# Patient Record
Sex: MALE | Race: WHITE | Employment: OTHER | URBAN - METROPOLITAN AREA
[De-identification: names, ages, dates, MRNs, and addresses within clinical notes are randomized per-mention and may not be internally consistent; named-entity substitution may affect disease eponyms.]

---

## 2017-02-27 ENCOUNTER — APPOINTMENT (OUTPATIENT)
Dept: CT IMAGING | Age: 72
DRG: 205 | End: 2017-02-27
Attending: EMERGENCY MEDICINE
Payer: MEDICARE

## 2017-02-27 ENCOUNTER — HOSPITAL ENCOUNTER (INPATIENT)
Facility: HOSPITAL | Age: 72
LOS: 1 days | Discharge: HOME | DRG: 205 | End: 2017-02-28
Attending: EMERGENCY MEDICINE | Admitting: INTERNAL MEDICINE
Payer: MEDICARE

## 2017-02-27 ENCOUNTER — APPOINTMENT (OUTPATIENT)
Dept: GENERAL RADIOLOGY | Age: 72
DRG: 205 | End: 2017-02-27
Attending: EMERGENCY MEDICINE
Payer: MEDICARE

## 2017-02-27 DIAGNOSIS — I10 ESSENTIAL HYPERTENSION, BENIGN: ICD-10-CM

## 2017-02-27 DIAGNOSIS — I50.9 ACUTE ON CHRONIC CONGESTIVE HEART FAILURE, UNSPECIFIED CONGESTIVE HEART FAILURE TYPE: Primary | ICD-10-CM

## 2017-02-27 DIAGNOSIS — J18.9 COMMUNITY ACQUIRED PNEUMONIA: ICD-10-CM

## 2017-02-27 DIAGNOSIS — G47.33 OSA (OBSTRUCTIVE SLEEP APNEA): ICD-10-CM

## 2017-02-27 LAB
ALBUMIN SERPL-MCNC: 3.9 G/DL (ref 3.5–4.8)
ALP LIVER SERPL-CCNC: 47 U/L (ref 45–117)
ALT SERPL-CCNC: 33 U/L (ref 17–63)
APTT PPP: 30.3 SECONDS (ref 25–34)
AST SERPL-CCNC: 24 U/L (ref 15–41)
ATRIAL RATE: 74 BPM
BASOPHILS # BLD AUTO: 0.04 X10(3) UL (ref 0–0.1)
BASOPHILS NFR BLD AUTO: 0.4 %
BILIRUB SERPL-MCNC: 0.5 MG/DL (ref 0.1–2)
BUN BLD-MCNC: 13 MG/DL (ref 8–20)
CALCIUM BLD-MCNC: 8.6 MG/DL (ref 8.3–10.3)
CHLORIDE: 101 MMOL/L (ref 101–111)
CO2: 34 MMOL/L (ref 22–32)
CREAT BLD-MCNC: 0.8 MG/DL (ref 0.7–1.3)
EOSINOPHIL # BLD AUTO: 0.45 X10(3) UL (ref 0–0.3)
EOSINOPHIL NFR BLD AUTO: 4.5 %
ERYTHROCYTE [DISTWIDTH] IN BLOOD BY AUTOMATED COUNT: 12.3 % (ref 11.5–16)
GLUCOSE BLD-MCNC: 105 MG/DL (ref 70–99)
HCT VFR BLD AUTO: 45.4 % (ref 37–53)
HGB BLD-MCNC: 15 G/DL (ref 13–17)
IMMATURE GRANULOCYTE COUNT: 0.03 X10(3) UL (ref 0–1)
IMMATURE GRANULOCYTE RATIO %: 0.3 %
INR BLD: 1.02 (ref 0.89–1.12)
LACTIC ACID: 0.7 MMOL/L (ref 0.5–2)
LYMPHOCYTES # BLD AUTO: 2.13 X10(3) UL (ref 0.9–4)
LYMPHOCYTES NFR BLD AUTO: 21.4 %
M PROTEIN MFR SERPL ELPH: 7.9 G/DL (ref 6.1–8.3)
MCH RBC QN AUTO: 32 PG (ref 27–33.2)
MCHC RBC AUTO-ENTMCNC: 33 G/DL (ref 31–37)
MCV RBC AUTO: 96.8 FL (ref 80–99)
MONOCYTES # BLD AUTO: 1.07 X10(3) UL (ref 0.1–0.6)
MONOCYTES NFR BLD AUTO: 10.8 %
NEUTROPHIL ABS PRELIM: 6.23 X10 (3) UL (ref 1.3–6.7)
NEUTROPHILS # BLD AUTO: 6.23 X10(3) UL (ref 1.3–6.7)
NEUTROPHILS NFR BLD AUTO: 62.6 %
P AXIS: 42 DEGREES
P-R INTERVAL: 202 MS
PLATELET # BLD AUTO: 284 10(3)UL (ref 150–450)
POTASSIUM SERPL-SCNC: 3.9 MMOL/L (ref 3.6–5.1)
PRO-BETA NATRIURETIC PEPTIDE: 135 PG/ML (ref ?–125)
PSA SERPL DL<=0.01 NG/ML-MCNC: 13.2 SECONDS (ref 11.8–14.2)
Q-T INTERVAL: 410 MS
QRS DURATION: 92 MS
QTC CALCULATION (BEZET): 455 MS
R AXIS: -12 DEGREES
RBC # BLD AUTO: 4.69 X10(6)UL (ref 3.8–5.8)
RED CELL DISTRIBUTION WIDTH-SD: 43.8 FL (ref 35.1–46.3)
SODIUM SERPL-SCNC: 138 MMOL/L (ref 136–144)
T AXIS: 46 DEGREES
TROPONIN: <0.046 NG/ML (ref ?–0.05)
VENTRICULAR RATE: 74 BPM
WBC # BLD AUTO: 10 X10(3) UL (ref 4–13)

## 2017-02-27 PROCEDURE — 87581 M.PNEUMON DNA AMP PROBE: CPT | Performed by: EMERGENCY MEDICINE

## 2017-02-27 PROCEDURE — 99285 EMERGENCY DEPT VISIT HI MDM: CPT

## 2017-02-27 PROCEDURE — 85730 THROMBOPLASTIN TIME PARTIAL: CPT | Performed by: EMERGENCY MEDICINE

## 2017-02-27 PROCEDURE — 85025 COMPLETE CBC W/AUTO DIFF WBC: CPT | Performed by: EMERGENCY MEDICINE

## 2017-02-27 PROCEDURE — 85610 PROTHROMBIN TIME: CPT | Performed by: EMERGENCY MEDICINE

## 2017-02-27 PROCEDURE — 71010 XR CHEST AP PORTABLE  (CPT=71010): CPT

## 2017-02-27 PROCEDURE — 87040 BLOOD CULTURE FOR BACTERIA: CPT | Performed by: EMERGENCY MEDICINE

## 2017-02-27 PROCEDURE — 83605 ASSAY OF LACTIC ACID: CPT | Performed by: EMERGENCY MEDICINE

## 2017-02-27 PROCEDURE — 87633 RESP VIRUS 12-25 TARGETS: CPT | Performed by: EMERGENCY MEDICINE

## 2017-02-27 PROCEDURE — 87798 DETECT AGENT NOS DNA AMP: CPT | Performed by: EMERGENCY MEDICINE

## 2017-02-27 PROCEDURE — 36415 COLL VENOUS BLD VENIPUNCTURE: CPT

## 2017-02-27 PROCEDURE — 83880 ASSAY OF NATRIURETIC PEPTIDE: CPT | Performed by: EMERGENCY MEDICINE

## 2017-02-27 PROCEDURE — 93005 ELECTROCARDIOGRAM TRACING: CPT

## 2017-02-27 PROCEDURE — 84484 ASSAY OF TROPONIN QUANT: CPT | Performed by: EMERGENCY MEDICINE

## 2017-02-27 PROCEDURE — 80053 COMPREHEN METABOLIC PANEL: CPT | Performed by: EMERGENCY MEDICINE

## 2017-02-27 PROCEDURE — 71275 CT ANGIOGRAPHY CHEST: CPT

## 2017-02-27 PROCEDURE — 87486 CHLMYD PNEUM DNA AMP PROBE: CPT | Performed by: EMERGENCY MEDICINE

## 2017-02-27 PROCEDURE — 96365 THER/PROPH/DIAG IV INF INIT: CPT

## 2017-02-27 PROCEDURE — 93010 ELECTROCARDIOGRAM REPORT: CPT

## 2017-02-27 PROCEDURE — 96367 TX/PROPH/DG ADDL SEQ IV INF: CPT

## 2017-02-27 PROCEDURE — 94640 AIRWAY INHALATION TREATMENT: CPT

## 2017-02-27 RX ORDER — IPRATROPIUM BROMIDE AND ALBUTEROL SULFATE 2.5; .5 MG/3ML; MG/3ML
3 SOLUTION RESPIRATORY (INHALATION) ONCE
Status: COMPLETED | OUTPATIENT
Start: 2017-02-27 | End: 2017-02-27

## 2017-02-27 NOTE — ED PROVIDER NOTES
Patient Seen in: [facility] In [place]    History   Patient presents with:  Dyspnea JACINTA SOB (respiratory)    Stated Complaint: jacinta     HPI    Patient is a 77-year-old with a history of GERD, hypertension, high cholesterol, low back pa :   methocarbamol 750 MG Oral Tab,  Take 750 mg by mouth 3 (three) times daily. Valsartan-Hydrochlorothiazide (DIOVAN HCT) 320-12.5 MG Oral Tab,  Take 1 tablet by mouth daily. simvastatin 20 MG Oral Tab,  Take 1 tablet (20 mg total) by mouth nightly. Exam    General: Patient is awake, alert in no acute distress. HEENT: Sclera are not icteric. Conjunctivae within normal limits. Mucous members are moist.   Cardiovascular: Regular rate and rhythm, normal S1-S2.   Respiratory: Lungs are clear to auscult PANEL FLU EXPANDED      EKG    Rate, intervals and axes as noted on EKG Report. Rate: 74  Rhythm: Sinus Rhythm  Reading: PVCs            CXR: No acute process     FINDINGS:  Cardiac size and pulmonary vasculature are within normal limits.  No pleural effus chest x-ray is clear. EKG normal sinus rhythm. BNP remained normal elevated however patient is obese. Certainly this will require further evaluation.   I was concerned about the possibility of PE given his symptoms, CT of the chest was negative for PE, d

## 2017-02-28 ENCOUNTER — APPOINTMENT (OUTPATIENT)
Dept: CV DIAGNOSTICS | Facility: HOSPITAL | Age: 72
DRG: 205 | End: 2017-02-28
Attending: INTERNAL MEDICINE
Payer: MEDICARE

## 2017-02-28 ENCOUNTER — APPOINTMENT (OUTPATIENT)
Dept: CV DIAGNOSTICS | Facility: HOSPITAL | Age: 72
DRG: 205 | End: 2017-02-28
Attending: HOSPITALIST
Payer: MEDICARE

## 2017-02-28 VITALS
WEIGHT: 251.13 LBS | OXYGEN SATURATION: 100 % | BODY MASS INDEX: 35.95 KG/M2 | HEIGHT: 70 IN | RESPIRATION RATE: 18 BRPM | TEMPERATURE: 98 F | HEART RATE: 69 BPM | SYSTOLIC BLOOD PRESSURE: 148 MMHG | DIASTOLIC BLOOD PRESSURE: 67 MMHG

## 2017-02-28 LAB
BASOPHILS # BLD AUTO: 0.03 X10(3) UL (ref 0–0.1)
BASOPHILS NFR BLD AUTO: 0.3 %
BUN BLD-MCNC: 10 MG/DL (ref 8–20)
CALCIUM BLD-MCNC: 8.7 MG/DL (ref 8.3–10.3)
CHLORIDE: 103 MMOL/L (ref 101–111)
CO2: 35 MMOL/L (ref 22–32)
CREAT BLD-MCNC: 0.65 MG/DL (ref 0.7–1.3)
EOSINOPHIL # BLD AUTO: 0.38 X10(3) UL (ref 0–0.3)
EOSINOPHIL NFR BLD AUTO: 3.8 %
ERYTHROCYTE [DISTWIDTH] IN BLOOD BY AUTOMATED COUNT: 12.2 % (ref 11.5–16)
GLUCOSE BLD-MCNC: 126 MG/DL (ref 70–99)
HAV IGM SER QL: 2 MG/DL (ref 1.7–3)
HCT VFR BLD AUTO: 43.5 % (ref 37–53)
HGB BLD-MCNC: 14.8 G/DL (ref 13–17)
IMMATURE GRANULOCYTE COUNT: 0.02 X10(3) UL (ref 0–1)
IMMATURE GRANULOCYTE RATIO %: 0.2 %
LACTIC ACID: 0.8 MMOL/L (ref 0.5–2)
LACTIC ACID: 0.9 MMOL/L (ref 0.5–2)
LYMPHOCYTES # BLD AUTO: 2.17 X10(3) UL (ref 0.9–4)
LYMPHOCYTES NFR BLD AUTO: 21.5 %
MCH RBC QN AUTO: 32.5 PG (ref 27–33.2)
MCHC RBC AUTO-ENTMCNC: 34 G/DL (ref 31–37)
MCV RBC AUTO: 95.6 FL (ref 80–99)
MONOCYTES # BLD AUTO: 0.95 X10(3) UL (ref 0.1–0.6)
MONOCYTES NFR BLD AUTO: 9.4 %
NEUTROPHIL ABS PRELIM: 6.55 X10 (3) UL (ref 1.3–6.7)
NEUTROPHILS # BLD AUTO: 6.55 X10(3) UL (ref 1.3–6.7)
NEUTROPHILS NFR BLD AUTO: 64.8 %
PLATELET # BLD AUTO: 223 10(3)UL (ref 150–450)
POTASSIUM SERPL-SCNC: 3.8 MMOL/L (ref 3.6–5.1)
PROCALCITONIN SERPL-MCNC: <0.11 NG/ML (ref ?–0.11)
RBC # BLD AUTO: 4.55 X10(6)UL (ref 3.8–5.8)
RED CELL DISTRIBUTION WIDTH-SD: 42.7 FL (ref 35.1–46.3)
RESPIRATORY PANEL NEG:: NEGATIVE
SODIUM SERPL-SCNC: 145 MMOL/L (ref 136–144)
WBC # BLD AUTO: 10.1 X10(3) UL (ref 4–13)

## 2017-02-28 PROCEDURE — 84145 PROCALCITONIN (PCT): CPT | Performed by: HOSPITALIST

## 2017-02-28 PROCEDURE — 93306 TTE W/DOPPLER COMPLETE: CPT

## 2017-02-28 PROCEDURE — 93018 CV STRESS TEST I&R ONLY: CPT | Performed by: INTERNAL MEDICINE

## 2017-02-28 PROCEDURE — 78452 HT MUSCLE IMAGE SPECT MULT: CPT

## 2017-02-28 PROCEDURE — 83605 ASSAY OF LACTIC ACID: CPT | Performed by: EMERGENCY MEDICINE

## 2017-02-28 PROCEDURE — 83735 ASSAY OF MAGNESIUM: CPT | Performed by: HOSPITALIST

## 2017-02-28 PROCEDURE — 93306 TTE W/DOPPLER COMPLETE: CPT | Performed by: INTERNAL MEDICINE

## 2017-02-28 PROCEDURE — 80048 BASIC METABOLIC PNL TOTAL CA: CPT | Performed by: HOSPITALIST

## 2017-02-28 PROCEDURE — 93017 CV STRESS TEST TRACING ONLY: CPT

## 2017-02-28 PROCEDURE — 85025 COMPLETE CBC W/AUTO DIFF WBC: CPT | Performed by: HOSPITALIST

## 2017-02-28 RX ORDER — VALSARTAN AND HYDROCHLOROTHIAZIDE 320; 12.5 MG/1; MG/1
1 TABLET, FILM COATED ORAL DAILY
Status: DISCONTINUED | OUTPATIENT
Start: 2017-02-28 | End: 2017-02-28 | Stop reason: RX

## 2017-02-28 RX ORDER — CETIRIZINE HYDROCHLORIDE 10 MG/1
10 TABLET ORAL DAILY
Status: DISCONTINUED | OUTPATIENT
Start: 2017-02-28 | End: 2017-02-28

## 2017-02-28 RX ORDER — ONDANSETRON 2 MG/ML
4 INJECTION INTRAMUSCULAR; INTRAVENOUS EVERY 6 HOURS PRN
Status: DISCONTINUED | OUTPATIENT
Start: 2017-02-28 | End: 2017-02-28

## 2017-02-28 RX ORDER — POTASSIUM CHLORIDE 20 MEQ/1
40 TABLET, EXTENDED RELEASE ORAL ONCE
Status: COMPLETED | OUTPATIENT
Start: 2017-02-28 | End: 2017-02-28

## 2017-02-28 RX ORDER — FUROSEMIDE 10 MG/ML
40 INJECTION INTRAMUSCULAR; INTRAVENOUS ONCE
Status: COMPLETED | OUTPATIENT
Start: 2017-02-28 | End: 2017-02-28

## 2017-02-28 RX ORDER — AMLODIPINE BESYLATE 5 MG/1
10 TABLET ORAL DAILY
Status: DISCONTINUED | OUTPATIENT
Start: 2017-02-28 | End: 2017-02-28

## 2017-02-28 RX ORDER — POTASSIUM CHLORIDE 750 MG/1
10 TABLET, EXTENDED RELEASE ORAL DAILY
Qty: 30 TABLET | Refills: 3 | Status: SHIPPED | OUTPATIENT
Start: 2017-02-28 | End: 2017-03-10

## 2017-02-28 RX ORDER — HEPARIN SODIUM 5000 [USP'U]/ML
5000 INJECTION, SOLUTION INTRAVENOUS; SUBCUTANEOUS EVERY 8 HOURS
Status: DISCONTINUED | OUTPATIENT
Start: 2017-02-28 | End: 2017-02-28

## 2017-02-28 RX ORDER — METHOCARBAMOL 750 MG/1
750 TABLET, FILM COATED ORAL 3 TIMES DAILY
Status: DISCONTINUED | OUTPATIENT
Start: 2017-02-28 | End: 2017-02-28

## 2017-02-28 RX ORDER — METOPROLOL SUCCINATE 100 MG/1
100 TABLET, EXTENDED RELEASE ORAL
Status: DISCONTINUED | OUTPATIENT
Start: 2017-02-28 | End: 2017-02-28

## 2017-02-28 RX ORDER — ACETAMINOPHEN 325 MG/1
650 TABLET ORAL EVERY 6 HOURS PRN
Status: DISCONTINUED | OUTPATIENT
Start: 2017-02-28 | End: 2017-02-28

## 2017-02-28 RX ORDER — FUROSEMIDE 20 MG/1
20 TABLET ORAL DAILY
Qty: 30 TABLET | Refills: 3 | Status: SHIPPED | OUTPATIENT
Start: 2017-02-28 | End: 2017-03-10

## 2017-02-28 RX ORDER — PRAVASTATIN SODIUM 20 MG
20 TABLET ORAL NIGHTLY
Status: DISCONTINUED | OUTPATIENT
Start: 2017-02-28 | End: 2017-02-28

## 2017-02-28 RX ORDER — FAMOTIDINE 20 MG/1
20 TABLET ORAL DAILY
Status: DISCONTINUED | OUTPATIENT
Start: 2017-02-28 | End: 2017-02-28

## 2017-02-28 RX ORDER — IPRATROPIUM BROMIDE AND ALBUTEROL SULFATE 2.5; .5 MG/3ML; MG/3ML
3 SOLUTION RESPIRATORY (INHALATION) EVERY 2 HOUR PRN
Status: DISCONTINUED | OUTPATIENT
Start: 2017-02-28 | End: 2017-02-28

## 2017-02-28 NOTE — CM/SW NOTE
SW attempted to meet with patient to complete assessment based on CHF protocol order set. Patient away for stress test. SW will follow up with patient as able to complete assessment.     [place], [facility]

## 2017-02-28 NOTE — ED NOTES
Report called and given to Charlotte Saleh pt updated and agrees to admissions and agrees to go by EMS ETA 45 MIN IVF infusing well via pump will cont monitoring

## 2017-02-28 NOTE — PLAN OF CARE
NURSING ADMISSION NOTE      Patient admitted via Ambulance  Oriented to room. Safety precautions initiated. Bed in low position. Call light in reach. Admitted from [place] ER due to dyspnea.   Reports he has had dyspnea with exertion and orthop

## 2017-02-28 NOTE — PROGRESS NOTES
CARDIODIAGNOSTIC PRELIMINARY REPORT:     MANUAL protocol completed with no new EKG changes noted; PVC's     Denied cardiac symptoms     Base:   142/84, HR 55;  Peak:  176/84,  (79% APMHR)    Second set of images pending

## 2017-02-28 NOTE — DIETARY NOTE
Nutrition Short Note     Dietitian consult received per heart failure standing order. Reviewed and provided handouts on meal planning for those with CHF. Pt states his wife cooks at home and enjoys eating meat and mashed potatoes.  Discussed sodium and flui

## 2017-02-28 NOTE — CONSULTS
[facility]    Nidia Valentine Patient Status:  Inpatient    1945 MRN [id number]   [facility] 2NE-A Attending Whitney Lynne, *   Hosp Day # 1 PCP Zena Angel MD     Date of Admission: 2017  4:18 PM  Admission Diagnosis: Co Oral Tab Take 1 tablet (20 mg total) by mouth daily. Disp: 30 tablet Rfl: 3   Potassium Chloride ER 10 MEQ Oral Tab CR Take 1 tablet (10 mEq total) by mouth daily. Take with lasix Disp: 30 tablet Rfl: 3   Valsartan-Hydrochlorothiazide (DIOVAN HCT) 320-12. 5 hrs:   BP Temp Temp src Pulse Resp SpO2 Weight   02/28/17 1616 (!) 162/77 mmHg 98.1 °F (36.7 °C) Oral 69 18 100 % -   02/28/17 1258 147/76 mmHg - - 68 - 100 % -   02/28/17 1140 (!) 177/74 mmHg 98.1 °F (36.7 °C) Oral 55 18 92 % -   02/28/17 1100 - - - - - 9 02/28/2017       Lab Results  Component Value Date    02/28/2017   K 3.8 02/28/2017    02/28/2017   CO2 35.0 02/28/2017   BUN 10 02/28/2017   CREATSERUM 0.65 02/28/2017    02/28/2017   CA 8.7 02/28/2017       Lab Results  Component Value

## 2017-02-28 NOTE — PROGRESS NOTES
Brief Internal Medicine Note    Full Note to Follow      Pt is a 69 yo with HTN/HL, GERD, who is admitted for sob. Pt reports that for the past few weeks, had noted progressively worsening KATZ. Last night noted +orthopnea but no PND. No new swelling.  No

## 2017-02-28 NOTE — H&P
[facility] Hospitalist H&P       CC: sob    PCP: Veena Kennedy MD    History of Present Illness:   Pt is a 71 yo with HTN/HL, GERD, who is admitted for sob.  Pt reports that for the past few weeks, had noted progressively worsening KATZ.  Last night noted +ortho tobacco: Never Used    Comment: quit 1971    Alcohol Use: Yes  0.0 oz/week    0 Standard drinks or equivalent per week         Comment: maybe one per month        Fam Hx  Family History   Problem Relation Age of Onset   • Heart Disorder Father    • Heart D Lab  02/27/17   1636   ALT  33   AST  24   ALB  3.9       Recent Labs   Lab  02/27/17   1636   TROP  <0.046       Additional Diagnostics: ECG: SR with occasional PVCs, HR 74  CXR: image personally reviewed agree with radiologist read    Radiology: Ct Ang disc disease mid to lower dorsal spine scoliosis without fracture. OTHER:  Normal.      2/27/2017  CONCLUSION:  1. Multiple images are degraded by breathing/motion artifact. 2. No definite pulmonary embolus.  3. There is mild bronchitis with peribronchial t urgency-suspect pili contributing     **GERD-no acute issues, monitor    PPx-heparin subq    Outpatient records or previous hospital records reviewed. Further recommendations pending patient's clinical course.   [facility] hospitalist to continue to follow matthew

## 2017-02-28 NOTE — CARDIAC REHAB
Received referral for heart failure education, and attempt made to see pt. Patient out of room for testing. Will revisit later.

## 2017-02-28 NOTE — CONSULTS
[facility] Cardiology Consultation    Everett Rob Budd Patient Status:  Inpatient    1945 MRN [id number]   [facility] 2NE-A Attending Gabriel Valentine MD   Hosp Day # 1 PCP Aldair Lin MD     Reason for Consultation:  dyspnea      History of Present valsartan-hydrochlorothiazide (DIOVAN /12. 5) combination tablet   Oral Daily   • furosemide  40 mg Intravenous Once   • Potassium Chloride ER  40 mEq Oral Once       Continuous Infusions:       Social History:   reports that he quit smoking about 46 diastolic heart failure. No systemic sx's and normal procal to suggest infection. 2. HTN - not optimal  3. Obesity  4. Chronic LBP/hip pains  5. HL - statin    Plan:  40 mg IV lasix now with potassium replacement.   Echo with doppler  cardiolite stress te

## 2017-02-28 NOTE — ED NOTES
PT UPDATED ON POC AND PENDING ADM TO HOSPITAL PT AGREEABLE W/MD ORDERS NO NEW CO AT THIS TIME WAITING BED ASSIGNMENT WILL CONT TO MONITOR/ASSESS

## 2017-03-01 NOTE — PROGRESS NOTES
Discharge instructions and meds discussed with pt and son with verbalization of understanding. IV and tele d/cd.

## 2017-03-10 PROCEDURE — 36415 COLL VENOUS BLD VENIPUNCTURE: CPT | Performed by: INTERNAL MEDICINE

## 2017-03-10 PROCEDURE — 80048 BASIC METABOLIC PNL TOTAL CA: CPT | Performed by: INTERNAL MEDICINE

## 2017-03-21 PROBLEM — I50.9 ACUTE ON CHRONIC CONGESTIVE HEART FAILURE, UNSPECIFIED CONGESTIVE HEART FAILURE TYPE: Status: RESOLVED | Noted: 2017-02-27 | Resolved: 2017-03-21

## 2017-03-21 PROBLEM — I50.9 ACUTE ON CHRONIC CONGESTIVE HEART FAILURE (HCC): Status: RESOLVED | Noted: 2017-02-27 | Resolved: 2017-03-21

## 2018-03-20 PROBLEM — G47.33 OSA ON CPAP: Status: ACTIVE | Noted: 2018-03-20

## 2018-03-20 PROBLEM — J18.9 COMMUNITY ACQUIRED PNEUMONIA: Status: RESOLVED | Noted: 2017-02-27 | Resolved: 2018-03-20

## 2018-03-20 PROBLEM — Z99.89 OSA ON CPAP: Status: ACTIVE | Noted: 2018-03-20

## 2018-04-06 PROCEDURE — 81003 URINALYSIS AUTO W/O SCOPE: CPT | Performed by: INTERNAL MEDICINE

## 2019-10-04 PROCEDURE — 88305 TISSUE EXAM BY PATHOLOGIST: CPT | Performed by: INTERNAL MEDICINE

## 2020-04-24 PROBLEM — K21.9 GASTROESOPHAGEAL REFLUX DISEASE, ESOPHAGITIS PRESENCE NOT SPECIFIED: Status: ACTIVE | Noted: 2020-04-24

## 2021-04-28 PROBLEM — K21.9 GASTROESOPHAGEAL REFLUX DISEASE: Status: ACTIVE | Noted: 2020-04-24

## 2021-04-28 PROBLEM — I50.30 CONGESTIVE HEART FAILURE, DIASTOLIC, LEFT, W/PRESERVED LV FUNCTION, NYHA CLASS 4 (HCC): Status: ACTIVE | Noted: 2017-02-27

## 2024-07-15 ENCOUNTER — APPOINTMENT (OUTPATIENT)
Dept: GENERAL RADIOLOGY | Age: 79
End: 2024-07-15
Attending: EMERGENCY MEDICINE
Payer: MEDICARE

## 2024-07-15 ENCOUNTER — HOSPITAL ENCOUNTER (EMERGENCY)
Age: 79
Discharge: HOME | End: 2024-07-15
Attending: EMERGENCY MEDICINE
Payer: MEDICARE

## 2024-07-15 VITALS
WEIGHT: 225 LBS | HEART RATE: 74 BPM | OXYGEN SATURATION: 99 % | BODY MASS INDEX: 35.31 KG/M2 | HEIGHT: 67 IN | SYSTOLIC BLOOD PRESSURE: 151 MMHG | TEMPERATURE: 99 F | RESPIRATION RATE: 16 BRPM | DIASTOLIC BLOOD PRESSURE: 67 MMHG

## 2024-07-15 DIAGNOSIS — R07.9 CHEST PAIN OF UNCERTAIN ETIOLOGY: Primary | ICD-10-CM

## 2024-07-15 LAB
ALBUMIN SERPL-MCNC: 3.6 G/DL (ref 3.4–5)
ALBUMIN/GLOB SERPL: 0.9 {RATIO} (ref 1–2)
ALP LIVER SERPL-CCNC: 43 U/L
ALT SERPL-CCNC: 34 U/L
ANION GAP SERPL CALC-SCNC: 3 MMOL/L (ref 0–18)
AST SERPL-CCNC: 27 U/L (ref 15–37)
ATRIAL RATE: 58 BPM
BASOPHILS # BLD AUTO: 0.03 X10(3) UL (ref 0–0.2)
BASOPHILS NFR BLD AUTO: 0.3 %
BILIRUB SERPL-MCNC: 0.5 MG/DL (ref 0.1–2)
BUN BLD-MCNC: 12 MG/DL (ref 9–23)
CALCIUM BLD-MCNC: 9 MG/DL (ref 8.5–10.1)
CHLORIDE SERPL-SCNC: 99 MMOL/L (ref 98–112)
CO2 SERPL-SCNC: 33 MMOL/L (ref 21–32)
CREAT BLD-MCNC: 0.86 MG/DL
EGFRCR SERPLBLD CKD-EPI 2021: 88 ML/MIN/1.73M2 (ref 60–?)
EOSINOPHIL # BLD AUTO: 0.48 X10(3) UL (ref 0–0.7)
EOSINOPHIL NFR BLD AUTO: 4.8 %
ERYTHROCYTE [DISTWIDTH] IN BLOOD BY AUTOMATED COUNT: 12.6 %
GLOBULIN PLAS-MCNC: 4 G/DL (ref 2.8–4.4)
GLUCOSE BLD-MCNC: 106 MG/DL (ref 70–99)
HCT VFR BLD AUTO: 35.7 %
HGB BLD-MCNC: 12.3 G/DL
IMM GRANULOCYTES # BLD AUTO: 0.03 X10(3) UL (ref 0–1)
IMM GRANULOCYTES NFR BLD: 0.3 %
LYMPHOCYTES # BLD AUTO: 2.2 X10(3) UL (ref 1–4)
LYMPHOCYTES NFR BLD AUTO: 22 %
MCH RBC QN AUTO: 33.5 PG (ref 26–34)
MCHC RBC AUTO-ENTMCNC: 34.5 G/DL (ref 31–37)
MCV RBC AUTO: 97.3 FL
MONOCYTES # BLD AUTO: 1.29 X10(3) UL (ref 0.1–1)
MONOCYTES NFR BLD AUTO: 12.9 %
NEUTROPHILS # BLD AUTO: 5.99 X10 (3) UL (ref 1.5–7.7)
NEUTROPHILS # BLD AUTO: 5.99 X10(3) UL (ref 1.5–7.7)
NEUTROPHILS NFR BLD AUTO: 59.7 %
OSMOLALITY SERPL CALC.SUM OF ELEC: 280 MOSM/KG (ref 275–295)
P AXIS: 5 DEGREES
P-R INTERVAL: 222 MS
PLATELET # BLD AUTO: 292 10(3)UL (ref 150–450)
POTASSIUM SERPL-SCNC: 4.3 MMOL/L (ref 3.5–5.1)
PROT SERPL-MCNC: 7.6 G/DL (ref 6.4–8.2)
Q-T INTERVAL: 428 MS
QRS DURATION: 96 MS
QTC CALCULATION (BEZET): 420 MS
R AXIS: -20 DEGREES
RBC # BLD AUTO: 3.67 X10(6)UL
SODIUM SERPL-SCNC: 135 MMOL/L (ref 136–145)
T AXIS: 35 DEGREES
TROPONIN I SERPL HS-MCNC: 15 NG/L
TROPONIN I SERPL HS-MCNC: 15 NG/L
VENTRICULAR RATE: 58 BPM
WBC # BLD AUTO: 10 X10(3) UL (ref 4–11)

## 2024-07-15 PROCEDURE — 93010 ELECTROCARDIOGRAM REPORT: CPT

## 2024-07-15 PROCEDURE — 84484 ASSAY OF TROPONIN QUANT: CPT | Performed by: EMERGENCY MEDICINE

## 2024-07-15 PROCEDURE — 99285 EMERGENCY DEPT VISIT HI MDM: CPT

## 2024-07-15 PROCEDURE — 71045 X-RAY EXAM CHEST 1 VIEW: CPT | Performed by: EMERGENCY MEDICINE

## 2024-07-15 PROCEDURE — 84484 ASSAY OF TROPONIN QUANT: CPT

## 2024-07-15 PROCEDURE — 85025 COMPLETE CBC W/AUTO DIFF WBC: CPT

## 2024-07-15 PROCEDURE — 36415 COLL VENOUS BLD VENIPUNCTURE: CPT

## 2024-07-15 PROCEDURE — 80053 COMPREHEN METABOLIC PANEL: CPT

## 2024-07-15 PROCEDURE — 93005 ELECTROCARDIOGRAM TRACING: CPT

## 2024-07-15 RX ORDER — CLOTRIMAZOLE AND BETAMETHASONE DIPROPIONATE 10; .64 MG/G; MG/G
CREAM TOPICAL
COMMUNITY
Start: 2023-08-30

## 2024-07-15 RX ORDER — IRBESARTAN 300 MG/1
300 TABLET ORAL
COMMUNITY
Start: 2024-03-20

## 2024-07-15 RX ORDER — LATANOPROST 50 UG/ML
SOLUTION/ DROPS OPHTHALMIC
COMMUNITY
Start: 2023-07-13

## 2024-07-15 NOTE — ED PROVIDER NOTES
Patient Seen in: [facility] In [place]      History     Chief Complaint   Patient presents with    Chest Pain Angina     Stated Complaint: Chest pain    Subjective:   HPI    This is a 79-year-old gentleman history of hypertension hyperlipidemia, here for evaluation of chest pain.  Patient states he was talking to customer service at his cell phone, [facility] was very frustrated, felt sudden onset of central chest pressure, squeezing sensation, lasted for about an hour and a half or 2 hours.  States over the past few months he has had similar symptoms, typically if he gets upset.  Although previous episodes have not been as severe as today.  Denies any discomfort at present, denies any recent exertional symptoms any recent changes exercise tolerance today there is no associated nausea vomiting diaphoresis.  No recent fevers or cold or cough symptoms, leg swelling, any other complaints or concerns.  No recent travel, no other concerning symptoms.            Objective:   No pertinent past medical history.            No pertinent past surgical history.              No pertinent social history.            Review of Systems    Positive for stated Chief Complaint: Chest Pain Angina    Other systems are as noted in HPI.  Constitutional and vital signs reviewed.      All other systems reviewed and negative except as noted above.    Physical Exam     ED Triage Vitals [07/15/24 1510]   /58   Pulse 57   Resp 16   Temp 98.5 °F (36.9 °C)   Temp src Temporal   SpO2 92 %   O2 Device None (Room air)       Current Vitals:   Vital Signs  BP: 158/60  Pulse: 53  Resp: 20  Temp: 98.5 °F (36.9 °C)  Temp src: Temporal    Oxygen Therapy  SpO2: 93 %  O2 Device: None (Room air)            Physical Exam        Physical Exam  Vitals signs and nursing note reviewed.   General: Well-appearing elderly gentleman sitting up in bed no acute distress  Head: Normocephalic and atraumatic.   HEENT:  Mucous membranes are  moist.   Cardiovascular:  Normal rate and regular rhythm.  No Edema  Pulmonary:  Pulmonary effort is normal.  Normal breath sounds. No wheezing, rhonchi or rales.   Abdominal: Soft nontender nondistended, normal bowel sounds, no guarding no rebound tenderness  Skin: Warm and dry  Neurological: Awake alert, speech is normal      ED Course     Labs Reviewed   COMP METABOLIC PANEL (14) - Abnormal; Notable for the following components:       Result Value    Glucose 106 (*)     Sodium 135 (*)     CO2 33.0 (*)     Alkaline Phosphatase 43 (*)     A/G Ratio 0.9 (*)     All other components within normal limits   CBC W/ DIFFERENTIAL - Abnormal; Notable for the following components:    RBC 3.67 (*)     HGB 12.3 (*)     HCT 35.7 (*)     Monocyte Absolute 1.29 (*)     All other components within normal limits   TROPONIN I HIGH SENSITIVITY - Normal   TROPONIN I HIGH SENSITIVITY - Normal   CBC WITH DIFFERENTIAL WITH PLATELET    Narrative:     The following orders were created for panel order CBC With Differential With Platelet.  Procedure                               Abnormality         Status                     ---------                               -----------         ------                     CBC W/ DIFFERENTIAL[[id number]]          Abnormal            Final result                 Please view results for these tests on the individual orders.   RAINBOW DRAW LAVENDER   RAINBOW DRAW LIGHT GREEN   RAINBOW DRAW BLUE     EKG    Rate, intervals and axes as noted on EKG Report.  Rate: 79  Rhythm: Sinus Rhythm  Reading: First-degree AV block, no acute ischemic                 XR CHEST AP PORTABLE  (CPT=71045)    Result Date: 7/15/2024  PROCEDURE:  XR CHEST AP PORTABLE  (CPT=71045)  TECHNIQUE:  AP chest radiograph was obtained.  COMPARISON:  [facility], XR, XR CHEST AP PORTABLE  (CPT=71010), 2/27/2017, 4:50 PM.  INDICATIONS:  Chest pain  PATIENT STATED HISTORY: (As transcribed by Technologist)  Patient states that he had chest pain  earlier today, but the pain has now subsided.    FINDINGS:  Suboptimal examination secondary to marked rotation.  Consider repeat PA and lateral radiographs as clinically appropriate.  Enlarged cardiac silhouette.  Tortuous thoracic aorta.  Mild right basilar atelectasis.  Mild blunting of the right costophrenic angle.  No measurable pneumothorax.            CONCLUSION:  See above.   LOCATION:  [id number]      Dictated by (CST): Stromberg, LeRoy, MD on 7/15/2024 at 5:54 PM     Finalized by (CST): Stromberg, LeRoy, MD on 7/15/2024 at 5:55 PM               MDM     This is a 79-year-old gentleman here for evaluation of chest discomfort, differential includes ACS musculoskeletal chest discomfort GERD,  Pneumonia pneumothorax.  EKG shows no acute ischemic changes, patient is chest pain-free at present, troponin is negative x 2, discussed case with Dr. Cruz from [facility] cardiology, agrees with plan for close follow-up for stress test within the next day or 2.  Patient would prefer discharge home and agrees with this plan he understands return to the ER if his symptoms worsen or change or any other new concerns.  He will be contacted for close follow-up and provocative testing at the [facility], understands to contact the cardiology office even if he does not hear from anybody by tomorrow afternoon.      I independently viewed and interpreted the following imaging: Chest x-ray without acute infiltrate                             Medical Decision Making      Disposition and Plan     Clinical Impression:  1. Chest pain of uncertain etiology         Disposition:  Discharge  7/15/2024  7:54 pm    Follow-up:  Peter Chaves MD  [address]  [address]  [place] [address] [phone]    Follow up  Insert primary follow    Colton Cruz MD  [address]  [address]  [place] [address]  [phone]    Follow up  Please contact the cardiology office to schedule an outpatient stress test within the next 24 to 48  hours if no one contacts you by tomorrow afternoon.          Medications Prescribed:  Current Discharge Medication List
